# Patient Record
(demographics unavailable — no encounter records)

---

## 2025-05-16 NOTE — ASSESSMENT
[FreeTextEntry1] : A/P Patient with mild constipation-controlled with Benefiber twice daily Hemorrhoids-low-volume rectal bleeding-Anusol HC cream twice daily as needed Daughters at the bedside all questions answered Recent blood work reviewed Follow-up in 3 months

## 2025-05-16 NOTE — PHYSICAL EXAM
[Alert] : alert [Normal Voice/Communication] : normal voice/communication [Healthy Appearing] : healthy appearing [No Respiratory Distress] : no respiratory distress [No Acc Muscle Use] : no accessory muscle use [Respiration, Rhythm And Depth] : normal respiratory rhythm and effort [Auscultation Breath Sounds / Voice Sounds] : lungs were clear to auscultation bilaterally [Heart Rate And Rhythm] : heart rate was normal and rhythm regular [Normal S1, S2] : normal S1 and S2 [Bowel Sounds] : normal bowel sounds [Abdomen Tenderness] : non-tender [No Masses] : no abdominal mass palpated [Abdomen Soft] : soft [Oriented To Time, Place, And Person] : oriented to person, place, and time [de-identified] : Patient has small internal hemorrhoids no masses

## 2025-05-16 NOTE — REASON FOR VISIT
[Follow-up] : a follow-up of an existing diagnosis [FreeTextEntry1] : constipation, mild rectal bleed

## 2025-05-16 NOTE — HISTORY OF PRESENT ILLNESS
[FreeTextEntry1] : Patient with known history of constipation.  She gets mild rectal bleeding if she strains.  She is on Benefiber twice daily.  She is having a bowel movement every other day.  Daughter is at the bedside.  She had a CAT scan in 2024 which showed increased stool burden diverticulosis hiatal hernia.  Sodium 132-BMP otherwise normal in February 2020

## 2025-06-11 NOTE — DISCUSSION/SUMMARY
[FreeTextEntry1] : 1) LE edema: Much improved. C/w lasix 20mg PRN and spironolatone 25mg QD  2) Mitral regurgitation: moderate: lasix as needed  3) HFrEF: Appears euvolemic.  Continue conervative treatment for HFrEF, no invasive treatment or procedures per family. C/w Metoprolol 25mg QD, lasix 20mg QD, losartan 25mg QD  [EKG obtained to assist in diagnosis and management of assessed problem(s)] : EKG obtained to assist in diagnosis and management of assessed problem(s)

## 2025-06-11 NOTE — HISTORY OF PRESENT ILLNESS
[FreeTextEntry1] : 97 year old female here for HFrEF and HTN.  PCP took her off the medications she was discharged on.  Is on amlodpine 10mg.    6/11/25: She was in the hospital recently for PNA.  Her EF was lower, EF 30-35%.  No CP or SOB.